# Patient Record
Sex: MALE | Race: WHITE | NOT HISPANIC OR LATINO | Employment: FULL TIME | URBAN - METROPOLITAN AREA
[De-identification: names, ages, dates, MRNs, and addresses within clinical notes are randomized per-mention and may not be internally consistent; named-entity substitution may affect disease eponyms.]

---

## 2017-09-05 ENCOUNTER — OFFICE VISIT (OUTPATIENT)
Dept: OCCUPATIONAL MEDICINE | Facility: CLINIC | Age: 67
End: 2017-09-05
Payer: OTHER MISCELLANEOUS

## 2017-09-05 VITALS
SYSTOLIC BLOOD PRESSURE: 122 MMHG | WEIGHT: 240 LBS | RESPIRATION RATE: 18 BRPM | OXYGEN SATURATION: 98 % | HEIGHT: 67 IN | TEMPERATURE: 98 F | DIASTOLIC BLOOD PRESSURE: 74 MMHG | BODY MASS INDEX: 37.67 KG/M2

## 2017-09-05 DIAGNOSIS — R55 SYNCOPE, UNSPECIFIED SYNCOPE TYPE: ICD-10-CM

## 2017-09-05 DIAGNOSIS — Z02.83 ENCOUNTER FOR DRUG SCREENING: Primary | ICD-10-CM

## 2017-09-05 PROCEDURE — 3074F SYST BP LT 130 MM HG: CPT | Mod: S$GLB,,, | Performed by: EMERGENCY MEDICINE

## 2017-09-05 PROCEDURE — 3008F BODY MASS INDEX DOCD: CPT | Mod: S$GLB,,, | Performed by: EMERGENCY MEDICINE

## 2017-09-05 PROCEDURE — 3078F DIAST BP <80 MM HG: CPT | Mod: S$GLB,,, | Performed by: EMERGENCY MEDICINE

## 2017-09-05 PROCEDURE — 99214 OFFICE O/P EST MOD 30 MIN: CPT | Mod: S$GLB,,, | Performed by: EMERGENCY MEDICINE

## 2017-09-05 RX ORDER — LIDOCAINE 50 MG/G
OINTMENT TOPICAL
Refills: 3 | COMMUNITY
Start: 2017-07-20

## 2017-09-05 RX ORDER — PRAVASTATIN SODIUM 10 MG/1
10 TABLET ORAL DAILY
COMMUNITY

## 2017-09-05 RX ORDER — METFORMIN HYDROCHLORIDE 1000 MG/1
1000 TABLET ORAL 2 TIMES DAILY WITH MEALS
COMMUNITY

## 2017-09-05 RX ORDER — FUROSEMIDE 20 MG/1
20 TABLET ORAL 2 TIMES DAILY
COMMUNITY

## 2017-09-05 RX ORDER — CLOBETASOL PROPIONATE 0.5 MG/G
CREAM TOPICAL
Refills: 1 | COMMUNITY
Start: 2017-06-13

## 2017-09-05 RX ORDER — FUROSEMIDE 40 MG/1
40 TABLET ORAL 2 TIMES DAILY
Refills: 3 | COMMUNITY
Start: 2017-06-22

## 2017-09-05 RX ORDER — DOXEPIN HYDROCHLORIDE 50 MG/G
CREAM TOPICAL
Refills: 3 | COMMUNITY
Start: 2017-07-20

## 2017-09-05 RX ORDER — POTASSIUM CHLORIDE 750 MG/1
10 TABLET, EXTENDED RELEASE ORAL 2 TIMES DAILY
Refills: 1 | COMMUNITY
Start: 2017-06-22

## 2017-09-05 RX ORDER — GLIMEPIRIDE 4 MG/1
4 TABLET ORAL 2 TIMES DAILY
Refills: 5 | COMMUNITY
Start: 2017-07-17

## 2017-09-05 NOTE — PROGRESS NOTES
Subjective:       Patient ID: Kwame Melendrez is a 67 y.o. male.    Chief Complaint: Dizziness (passed out) and Drug / Alcohol Assessment    HPI  ROS    Objective:      Physical Exam    Assessment:       1. Syncope, unspecified syncope type        Plan:                   .[email or web address]

## 2017-09-05 NOTE — PROGRESS NOTES
"Subjective:       Patient ID: Kwame Melendrez is a 67 y.o. male.    Vitals:  height is 5' 7" (1.702 m) and weight is 108.9 kg (240 lb). His temperature is 97.8 °F (36.6 °C). His blood pressure is 122/74. His respiration is 18 and oxygen saturation is 98%.     Chief Complaint: Dizziness (passed out)    Pt. States was bending over at work for about 30 sec, got up quickly, felt dizzy and co-workers state he passed out, pt. Without complaints currently, occurred PTA.      Fatigue   This is a new problem. The current episode started today. Associated symptoms include fatigue. The symptoms are aggravated by walking. He has tried nothing for the symptoms.     Review of Systems   Constitution: Positive for fatigue.   Neurological: Positive for dizziness, light-headedness and loss of balance.       Objective:      Physical Exam   Constitutional: He is oriented to person, place, and time. He appears well-developed and well-nourished.   HENT:   Head: Normocephalic and atraumatic.   Right Ear: External ear normal.   Left Ear: External ear normal.   Nose: Nose normal.   Mouth/Throat: Oropharynx is clear and moist.   Eyes: EOM are normal. Pupils are equal, round, and reactive to light.   Neck: Normal range of motion. Neck supple.   Cardiovascular: Normal rate, regular rhythm and normal heart sounds.    Pulmonary/Chest: Breath sounds normal. He exhibits no tenderness.   Abdominal: Soft.   Musculoskeletal: Normal range of motion.   Lymphadenopathy:     He has no cervical adenopathy.   Neurological: He is alert and oriented to person, place, and time. No cranial nerve deficit or sensory deficit. He exhibits normal muscle tone. Coordination normal.   Skin: Skin is warm and dry.   Psychiatric: He has a normal mood and affect. His behavior is normal.       Assessment:       1. Syncope, unspecified syncope type        Plan:         Syncope, unspecified syncope type  -     Ambulatory referral to Internal Medicine      Chemo Pan, " MD  Go to the Emergency Department for any problems

## 2017-09-05 NOTE — LETTER
September 5, 2017      [facility]  [address] [address], [address]  [place] [address]  Phone: [phone]  Fax: [phone]       Patient: Kwame Melendrez   YOB: 1950  Date of Visit: 09/05/2017    To Whom It May Concern:    Alex Melendrez  was at [facility] on 09/05/2017. He may return to work/school on 9/6/17 per follow up physician approval with no restrictions. If you have any questions or concerns, or if I can be of further assistance, please do not hesitate to contact me.    Sincerely,          Chemo Pan MD

## 2017-09-05 NOTE — PROGRESS NOTES
Subjective:       Patient ID: Kwame Melendrez is a 67 y.o. male.    Chief Complaint: Dizziness (passed out) and Drug / Alcohol Assessment    HPI  ROS    Objective:      Physical Exam    Assessment:       1. Syncope, unspecified syncope type        Plan:                   Pt came in for non dot drug screen

## 2017-09-05 NOTE — PATIENT INSTRUCTIONS
Fainting: Uncertain Cause  Fainting (syncope) is a temporary loss of consciousness. Its also called passing out. It occurs when blood flow to the brain is less than normal. Near-fainting (near-syncope) is very similar to fainting, but you dont fully pass out.  Common minor causes of fainting include:  · Sudden fear  · Pain  · Nausea  · Emotional stress  · Overexertion  Suddenly standing up after sitting or lying for a long time can also cause fainting.  More serious causes of fainting include:  · Very slow or very fast heartbeat (arrhythmia)  · Other types of heart disease  · Dehydration  · Loss of blood loss  · Seizure  · Stroke  · Ruptured blood vessel in the brain  Taking too much high blood pressure medicine can also cause low blood pressure and fainting.  Your health care provider does not know the exact cause of your fainting. But the tests today did not show any of the serious causes of fainting. Sometimes you may need more tests to find out if you have a serious problem. Thats why its important to follow up with your provider as advised.  Home care  Follow these guidelines when caring for yourself at home:  · Rest today. You may go back to your normal activities when you are feeling back to normal. It is best to stay with someone who can check on you for the next 24 hours to watch for another episode of fainting.  · If you become lightheaded or dizzy, lie down right away. Or sit with your head between your knees.  · Because the provider doesnt know the exact cause of your fainting or near-fainting spell, its possible for you to have another spell without warning. Because of this, dont drive a car or operate dangerous equipment. Dont take a bath alone. Use a shower instead. Dont swim alone until your health care provider says that you are no longer in danger of having another fainting spell.  Follow-up care  Follow up with your health care provider, or as advised.  When to seek medical advice  Call  your health care provider right away if any of these occur:  · Another fainting spell thats not explained by the common causes listed above  · Pain in your chest, arm, neck, jaw, back, or abdomen  · Shortness of breath  · Severe headache or seizure  · Blood in vomit or stools (black or red color)  · Unexpected vaginal bleeding  · Your heart beats very rapidly, very slowly, or irregularly (palpitations)  Also call your provider if you have signs of stroke:  · Weakness in an arm or leg or on one side of the face  · Difficulty speaking or seeing  · Extreme drowsiness, confusion, dizziness, or fainting  Date Last Reviewed: 11/25/2014  © 2184-8886 [employer]. [address] [place] [address], [place] [address]. All rights reserved. This information is not intended as a substitute for professional medical care. Always follow your healthcare professional's instructions.      No driving.    Follow up with your Physician today and/or go to ED for further evaluation.    Chemo Pan MD  Go to the Emergency Department for any problems

## 2017-09-07 ENCOUNTER — TELEPHONE (OUTPATIENT)
Dept: URGENT CARE | Facility: CLINIC | Age: 67
End: 2017-09-07

## 2017-09-26 ENCOUNTER — CLINICAL SUPPORT (OUTPATIENT)
Dept: OTHER | Facility: CLINIC | Age: 67
End: 2017-09-26
Payer: COMMERCIAL

## 2017-09-26 VITALS
DIASTOLIC BLOOD PRESSURE: 58 MMHG | BODY MASS INDEX: 36.41 KG/M2 | HEIGHT: 67 IN | SYSTOLIC BLOOD PRESSURE: 103 MMHG | WEIGHT: 232 LBS

## 2017-09-26 DIAGNOSIS — Z00.8 HEALTH EXAMINATION IN POPULATION SURVEYS: Primary | ICD-10-CM

## 2017-09-26 LAB
GLUCOSE SERPL-MCNC: ABNORMAL MG/DL (ref 60–140)
HBA1C MFR BLD: 7.6 % (ref 0–5.7)
POC CHOLESTEROL, HDL: 32 MG/DL (ref 40–?)
POC CHOLESTEROL, LDL: 82 MG/DL (ref ?–160)
POC CHOLESTEROL, TOTAL: 160 MG/DL (ref ?–240)
POC GLUCOSE FASTING: 183 MG/DL (ref 60–110)
POC TOTAL CHOLESTEROL / HDL RATIO: 5 (ref ?–6)
POC TRIGLYCERIDES: 232 MG/DL (ref ?–160)

## 2017-09-26 PROCEDURE — 99401 PREV MED CNSL INDIV APPRX 15: CPT | Mod: S$GLB,,, | Performed by: INTERNAL MEDICINE

## 2017-09-26 PROCEDURE — 82947 ASSAY GLUCOSE BLOOD QUANT: CPT | Mod: QW,S$GLB,, | Performed by: INTERNAL MEDICINE

## 2017-09-26 PROCEDURE — 80061 LIPID PANEL: CPT | Mod: QW,S$GLB,, | Performed by: INTERNAL MEDICINE

## 2017-09-26 PROCEDURE — 83036 HEMOGLOBIN GLYCOSYLATED A1C: CPT | Mod: QW,S$GLB,, | Performed by: INTERNAL MEDICINE

## 2018-01-19 ENCOUNTER — CLINICAL SUPPORT (OUTPATIENT)
Dept: URGENT CARE | Facility: CLINIC | Age: 68
End: 2018-01-19

## 2018-01-19 DIAGNOSIS — Z00.00 PHYSICAL EXAM: Primary | ICD-10-CM

## 2018-01-19 PROCEDURE — 99499 UNLISTED E&M SERVICE: CPT | Mod: S$GLB,,, | Performed by: PREVENTIVE MEDICINE

## 2018-02-01 ENCOUNTER — CLINICAL SUPPORT (OUTPATIENT)
Dept: OCCUPATIONAL MEDICINE | Facility: CLINIC | Age: 68
End: 2018-02-01

## 2018-02-01 DIAGNOSIS — Z00.00 PHYSICAL EXAM: ICD-10-CM

## 2018-02-01 PROCEDURE — 99499 UNLISTED E&M SERVICE: CPT | Mod: S$GLB,,, | Performed by: PREVENTIVE MEDICINE

## 2018-11-06 ENCOUNTER — CLINICAL SUPPORT (OUTPATIENT)
Dept: OTHER | Facility: CLINIC | Age: 68
End: 2018-11-06
Payer: COMMERCIAL

## 2018-11-06 DIAGNOSIS — Z00.8 ENCOUNTER FOR OTHER GENERAL EXAMINATION: ICD-10-CM

## 2018-11-06 PROCEDURE — 99401 PREV MED CNSL INDIV APPRX 15: CPT | Mod: S$GLB,,, | Performed by: INTERNAL MEDICINE

## 2018-11-06 PROCEDURE — 80061 LIPID PANEL: CPT | Mod: QW,S$GLB,, | Performed by: INTERNAL MEDICINE

## 2018-11-06 PROCEDURE — 82947 ASSAY GLUCOSE BLOOD QUANT: CPT | Mod: QW,S$GLB,, | Performed by: INTERNAL MEDICINE

## 2018-11-08 VITALS — BODY MASS INDEX: 36.34 KG/M2 | HEIGHT: 67 IN

## 2018-11-08 LAB
HBA1C MFR BLD: 7.3 %
HDLC SERPL-MCNC: 33 MG/DL
POC CHOLESTEROL, LDL (DOCK): 94 MG/DL
POC CHOLESTEROL, TOTAL: 160 MG/DL
POC GLUCOSE, FASTING: 176 MG/DL
TRIGL SERPL-MCNC: 165 MG/DL

## 2021-09-11 DIAGNOSIS — U07.1 COVID-19: Primary | ICD-10-CM
